# Patient Record
Sex: FEMALE | Race: WHITE | NOT HISPANIC OR LATINO
[De-identification: names, ages, dates, MRNs, and addresses within clinical notes are randomized per-mention and may not be internally consistent; named-entity substitution may affect disease eponyms.]

---

## 2017-09-25 ENCOUNTER — RESULT REVIEW (OUTPATIENT)
Age: 47
End: 2017-09-25

## 2018-09-27 ENCOUNTER — RESULT REVIEW (OUTPATIENT)
Age: 48
End: 2018-09-27

## 2019-10-03 ENCOUNTER — RESULT REVIEW (OUTPATIENT)
Age: 49
End: 2019-10-03

## 2020-02-06 ENCOUNTER — RESULT REVIEW (OUTPATIENT)
Age: 50
End: 2020-02-06

## 2021-06-15 DIAGNOSIS — N83.201 UNSPECIFIED OVARIAN CYST, RIGHT SIDE: ICD-10-CM

## 2021-06-15 DIAGNOSIS — R92.2 INCONCLUSIVE MAMMOGRAM: ICD-10-CM

## 2021-06-15 DIAGNOSIS — N83.202 UNSPECIFIED OVARIAN CYST, RIGHT SIDE: ICD-10-CM

## 2021-10-18 ENCOUNTER — APPOINTMENT (OUTPATIENT)
Dept: OBGYN | Facility: CLINIC | Age: 51
End: 2021-10-18
Payer: COMMERCIAL

## 2021-10-18 VITALS
HEIGHT: 64 IN | BODY MASS INDEX: 21 KG/M2 | WEIGHT: 123 LBS | DIASTOLIC BLOOD PRESSURE: 70 MMHG | SYSTOLIC BLOOD PRESSURE: 124 MMHG

## 2021-10-18 PROCEDURE — 82274 ASSAY TEST FOR BLOOD FECAL: CPT | Mod: NC,QW

## 2021-10-18 PROCEDURE — 99396 PREV VISIT EST AGE 40-64: CPT

## 2021-10-19 LAB — HPV HIGH+LOW RISK DNA PNL CVX: NOT DETECTED

## 2021-10-22 LAB — CYTOLOGY CVX/VAG DOC THIN PREP: NORMAL

## 2021-11-18 ENCOUNTER — APPOINTMENT (OUTPATIENT)
Dept: OBGYN | Facility: CLINIC | Age: 51
End: 2021-11-18

## 2021-11-23 ENCOUNTER — APPOINTMENT (OUTPATIENT)
Dept: OBGYN | Facility: CLINIC | Age: 51
End: 2021-11-23
Payer: COMMERCIAL

## 2021-11-23 VITALS
DIASTOLIC BLOOD PRESSURE: 80 MMHG | SYSTOLIC BLOOD PRESSURE: 124 MMHG | WEIGHT: 125 LBS | HEIGHT: 64 IN | BODY MASS INDEX: 21.34 KG/M2

## 2021-11-23 DIAGNOSIS — Z00.00 ENCOUNTER FOR GENERAL ADULT MEDICAL EXAMINATION W/OUT ABNORMAL FINDINGS: ICD-10-CM

## 2021-11-23 DIAGNOSIS — N84.1 POLYP OF CERVIX UTERI: ICD-10-CM

## 2021-11-23 PROCEDURE — 57500 BIOPSY OF CERVIX: CPT

## 2021-12-07 LAB — CORE LAB BIOPSY: NORMAL

## 2021-12-16 ENCOUNTER — NON-APPOINTMENT (OUTPATIENT)
Age: 51
End: 2021-12-16

## 2022-01-11 ENCOUNTER — APPOINTMENT (OUTPATIENT)
Dept: OBGYN | Facility: CLINIC | Age: 52
End: 2022-01-11
Payer: COMMERCIAL

## 2022-01-11 ENCOUNTER — ASOB RESULT (OUTPATIENT)
Age: 52
End: 2022-01-11

## 2022-01-11 VITALS — DIASTOLIC BLOOD PRESSURE: 81 MMHG | SYSTOLIC BLOOD PRESSURE: 126 MMHG

## 2022-01-11 DIAGNOSIS — N92.4 EXCESSIVE BLEEDING IN THE PREMENOPAUSAL PERIOD: ICD-10-CM

## 2022-01-11 LAB — HCG UR QL: NEGATIVE

## 2022-01-11 PROCEDURE — 99214 OFFICE O/P EST MOD 30 MIN: CPT | Mod: 25

## 2022-01-11 PROCEDURE — 76830 TRANSVAGINAL US NON-OB: CPT

## 2022-01-11 PROCEDURE — 58100 BIOPSY OF UTERUS LINING: CPT

## 2022-01-20 LAB — CORE LAB BIOPSY: NORMAL

## 2022-01-20 RX ORDER — MEDROXYPROGESTERONE ACETATE 10 MG/1
10 TABLET ORAL DAILY
Qty: 10 | Refills: 0 | Status: ACTIVE | COMMUNITY
Start: 2022-01-20 | End: 1900-01-01

## 2022-06-21 ENCOUNTER — APPOINTMENT (OUTPATIENT)
Dept: ULTRASOUND IMAGING | Facility: CLINIC | Age: 52
End: 2022-06-21

## 2022-07-19 ENCOUNTER — RESULT REVIEW (OUTPATIENT)
Age: 52
End: 2022-07-19

## 2022-07-21 ENCOUNTER — APPOINTMENT (OUTPATIENT)
Dept: ULTRASOUND IMAGING | Facility: CLINIC | Age: 52
End: 2022-07-21

## 2022-07-21 ENCOUNTER — RESULT REVIEW (OUTPATIENT)
Age: 52
End: 2022-07-21

## 2022-07-21 ENCOUNTER — APPOINTMENT (OUTPATIENT)
Dept: MAMMOGRAPHY | Facility: CLINIC | Age: 52
End: 2022-07-21

## 2022-07-21 PROCEDURE — 76641 ULTRASOUND BREAST COMPLETE: CPT | Mod: 50

## 2022-07-21 PROCEDURE — 76830 TRANSVAGINAL US NON-OB: CPT

## 2022-07-21 PROCEDURE — 77067 SCR MAMMO BI INCL CAD: CPT

## 2022-07-21 PROCEDURE — 76856 US EXAM PELVIC COMPLETE: CPT

## 2022-07-21 PROCEDURE — 77063 BREAST TOMOSYNTHESIS BI: CPT

## 2022-10-20 ENCOUNTER — NON-APPOINTMENT (OUTPATIENT)
Age: 52
End: 2022-10-20

## 2022-10-20 ENCOUNTER — APPOINTMENT (OUTPATIENT)
Dept: OBGYN | Facility: CLINIC | Age: 52
End: 2022-10-20

## 2022-10-20 VITALS
BODY MASS INDEX: 21.34 KG/M2 | WEIGHT: 125 LBS | DIASTOLIC BLOOD PRESSURE: 72 MMHG | HEIGHT: 64 IN | SYSTOLIC BLOOD PRESSURE: 121 MMHG

## 2022-10-20 DIAGNOSIS — Z01.411 ENCOUNTER FOR GYNECOLOGICAL EXAMINATION (GENERAL) (ROUTINE) WITH ABNORMAL FINDINGS: ICD-10-CM

## 2022-10-20 DIAGNOSIS — N92.6 IRREGULAR MENSTRUATION, UNSPECIFIED: ICD-10-CM

## 2022-10-20 PROCEDURE — 82270 OCCULT BLOOD FECES: CPT

## 2022-10-20 PROCEDURE — 99396 PREV VISIT EST AGE 40-64: CPT

## 2022-10-24 LAB — HPV HIGH+LOW RISK DNA PNL CVX: NOT DETECTED

## 2022-10-27 LAB — CYTOLOGY CVX/VAG DOC THIN PREP: NORMAL

## 2022-12-31 ENCOUNTER — NON-APPOINTMENT (OUTPATIENT)
Age: 52
End: 2022-12-31

## 2023-08-21 DIAGNOSIS — Z12.39 ENCOUNTER FOR OTHER SCREENING FOR MALIGNANT NEOPLASM OF BREAST: ICD-10-CM

## 2023-08-22 ENCOUNTER — RESULT REVIEW (OUTPATIENT)
Age: 53
End: 2023-08-22

## 2023-08-22 ENCOUNTER — APPOINTMENT (OUTPATIENT)
Dept: ULTRASOUND IMAGING | Facility: CLINIC | Age: 53
End: 2023-08-22
Payer: COMMERCIAL

## 2023-08-22 ENCOUNTER — APPOINTMENT (OUTPATIENT)
Dept: MAMMOGRAPHY | Facility: CLINIC | Age: 53
End: 2023-08-22
Payer: COMMERCIAL

## 2023-08-22 PROCEDURE — 76641 ULTRASOUND BREAST COMPLETE: CPT | Mod: 50

## 2023-08-22 PROCEDURE — 77067 SCR MAMMO BI INCL CAD: CPT

## 2023-08-22 PROCEDURE — 76856 US EXAM PELVIC COMPLETE: CPT

## 2023-08-22 PROCEDURE — 77063 BREAST TOMOSYNTHESIS BI: CPT

## 2024-01-12 ENCOUNTER — APPOINTMENT (OUTPATIENT)
Dept: OBGYN | Facility: CLINIC | Age: 54
End: 2024-01-12
Payer: COMMERCIAL

## 2024-01-12 VITALS
DIASTOLIC BLOOD PRESSURE: 79 MMHG | BODY MASS INDEX: 21.34 KG/M2 | WEIGHT: 125 LBS | HEIGHT: 64 IN | SYSTOLIC BLOOD PRESSURE: 129 MMHG

## 2024-01-12 DIAGNOSIS — Z01.419 ENCOUNTER FOR GYNECOLOGICAL EXAMINATION (GENERAL) (ROUTINE) W/OUT ABNORMAL FINDINGS: ICD-10-CM

## 2024-01-12 DIAGNOSIS — Z01.411 ENCOUNTER FOR GYNECOLOGICAL EXAMINATION (GENERAL) (ROUTINE) WITH ABNORMAL FINDINGS: ICD-10-CM

## 2024-01-12 PROCEDURE — 82270 OCCULT BLOOD FECES: CPT

## 2024-01-12 PROCEDURE — 99396 PREV VISIT EST AGE 40-64: CPT

## 2024-01-12 NOTE — SIGNATURES
[TextEntry] : This note was authored by Destiney Lemons working as a scribe for Dr. Dmitry Ojeda.   I, Dr. Dmitry Ojeda, have reviewed the content of this note and confirm it is true and accurate. I personally performed the history and physical examination and made all the decisions. 01/12/2024

## 2024-01-12 NOTE — PLAN
[FreeTextEntry1] : Health Maintenance: Normal gyn examination. Pap smear performed. Advised pt to schedule bone density. F/u with [facility] for screening laboratories. Nutrition and exercised discussed. RTO in 1 year, or PRN.

## 2024-01-12 NOTE — HISTORY OF PRESENT ILLNESS
[FreeTextEntry1] : 2024 MIGUEL ANGEL CASTELLANOS 53-year-old female presents for an annual gyn exam.  Patient offers no complaints. No abnormal vaginal bleeding, pain, or vaginal discharge.  Reviewed last mammogram and breast sonogram from  - [facility]. Reviewed last pelvic sonogram from  - stable right ovarian cyst 2.5 cm.  Denies changes in medical status, medications, serious illness, hospitalizations, and surgeries. Reviewed patient's current medications.  OBHx  x3 w/ two sons and one daughter SHx breast reduction FamHx MGM w/ breast ca in her 40s, father w/ Alzheimer's

## 2024-01-16 LAB — HPV HIGH+LOW RISK DNA PNL CVX: NOT DETECTED

## 2024-01-17 ENCOUNTER — APPOINTMENT (OUTPATIENT)
Dept: OBGYN | Facility: CLINIC | Age: 54
End: 2024-01-17
Payer: COMMERCIAL

## 2024-01-17 PROCEDURE — 77080 DXA BONE DENSITY AXIAL: CPT

## 2024-01-19 LAB — CYTOLOGY CVX/VAG DOC THIN PREP: ABNORMAL

## 2024-08-20 DIAGNOSIS — N83.209 UNSPECIFIED OVARIAN CYST, UNSPECIFIED SIDE: ICD-10-CM

## 2024-08-22 ENCOUNTER — RESULT REVIEW (OUTPATIENT)
Age: 54
End: 2024-08-22

## 2024-08-22 ENCOUNTER — APPOINTMENT (OUTPATIENT)
Dept: ULTRASOUND IMAGING | Facility: CLINIC | Age: 54
End: 2024-08-22
Payer: COMMERCIAL

## 2024-08-22 ENCOUNTER — APPOINTMENT (OUTPATIENT)
Dept: MAMMOGRAPHY | Facility: CLINIC | Age: 54
End: 2024-08-22
Payer: COMMERCIAL

## 2024-08-22 PROCEDURE — 76856 US EXAM PELVIC COMPLETE: CPT

## 2024-08-22 PROCEDURE — 76641 ULTRASOUND BREAST COMPLETE: CPT | Mod: 50

## 2024-08-22 PROCEDURE — 77067 SCR MAMMO BI INCL CAD: CPT

## 2024-08-22 PROCEDURE — 77063 BREAST TOMOSYNTHESIS BI: CPT

## 2025-03-04 ENCOUNTER — APPOINTMENT (OUTPATIENT)
Dept: OBGYN | Facility: CLINIC | Age: 55
End: 2025-03-04
Payer: COMMERCIAL

## 2025-03-04 ENCOUNTER — NON-APPOINTMENT (OUTPATIENT)
Age: 55
End: 2025-03-04

## 2025-03-04 VITALS
SYSTOLIC BLOOD PRESSURE: 118 MMHG | WEIGHT: 125 LBS | DIASTOLIC BLOOD PRESSURE: 71 MMHG | HEIGHT: 64 IN | BODY MASS INDEX: 21.34 KG/M2

## 2025-03-04 DIAGNOSIS — R68.89 OTHER GENERAL SYMPTOMS AND SIGNS: ICD-10-CM

## 2025-03-04 PROCEDURE — 36415 COLL VENOUS BLD VENIPUNCTURE: CPT

## 2025-03-04 PROCEDURE — 99396 PREV VISIT EST AGE 40-64: CPT

## 2025-03-05 LAB
BASOPHILS # BLD AUTO: 0.07 K/UL
BASOPHILS NFR BLD AUTO: 1.3 %
EOSINOPHIL # BLD AUTO: 0.14 K/UL
EOSINOPHIL NFR BLD AUTO: 2.5 %
HCT VFR BLD CALC: 39.2 %
HGB BLD-MCNC: 13 G/DL
HPV HIGH+LOW RISK DNA PNL CVX: NOT DETECTED
IMM GRANULOCYTES NFR BLD AUTO: 0.2 %
LYMPHOCYTES # BLD AUTO: 1.82 K/UL
LYMPHOCYTES NFR BLD AUTO: 32.7 %
MAN DIFF?: NORMAL
MCHC RBC-ENTMCNC: 31.1 PG
MCHC RBC-ENTMCNC: 33.2 G/DL
MCV RBC AUTO: 93.8 FL
MONOCYTES # BLD AUTO: 0.4 K/UL
MONOCYTES NFR BLD AUTO: 7.2 %
NEUTROPHILS # BLD AUTO: 3.13 K/UL
NEUTROPHILS NFR BLD AUTO: 56.1 %
PLATELET # BLD AUTO: 222 K/UL
RBC # BLD: 4.18 M/UL
RBC # FLD: 12.7 %
T4 FREE SERPL-MCNC: 1 NG/DL
TSH SERPL-ACNC: 2.84 UIU/ML
WBC # FLD AUTO: 5.57 K/UL

## 2025-03-12 LAB — CYTOLOGY CVX/VAG DOC THIN PREP: ABNORMAL

## 2025-03-17 ENCOUNTER — APPOINTMENT (OUTPATIENT)
Dept: OBGYN | Facility: CLINIC | Age: 55
End: 2025-03-17
Payer: COMMERCIAL

## 2025-03-17 DIAGNOSIS — R87.615 UNSATISFACTORY CYTOLOGIC SMEAR OF CERVIX: ICD-10-CM

## 2025-03-24 LAB
CYTOLOGY CVX/VAG DOC THIN PREP: ABNORMAL
HPV HIGH+LOW RISK DNA PNL CVX: NOT DETECTED